# Patient Record
Sex: MALE | Race: WHITE | NOT HISPANIC OR LATINO | URBAN - METROPOLITAN AREA
[De-identification: names, ages, dates, MRNs, and addresses within clinical notes are randomized per-mention and may not be internally consistent; named-entity substitution may affect disease eponyms.]

---

## 2017-01-25 NOTE — PATIENT DISCUSSION
GLAUCOMA SUSPECT, OU : INCREASED CUP/DISC RATIO WITH MILD VF AND OCT RNFL CHANGES. DISCUSSED TX V OBS.  PATIENT WISHES TO WAIT ON INTERVENTION AT THIS TIME

## 2017-01-25 NOTE — PATIENT DISCUSSION
Glaucoma Suspect Counseling: I have explained to the patient at length glaucoma potentially causes loss of peripheral vision due to damage to the optic nerve. I have explained that damage to the optic nerve from glaucoma is irreversible and that the available treatments for glaucoma are designed to prevent further damage if we determine glaucoma is present. I have explained the importance of regular follow-up with dilated eye exams to monitor for possible progression.

## 2017-01-25 NOTE — PATIENT DISCUSSION
Diabetes without Retinopathy Counseling : I have discussed with the patient the importance of controlling blood glucose, blood pressure and lipid levels levels to minimize the risk of developing retinal disease from diabetes. Explained the importance of annual dilated eye exams because effective treatment for diabetic retinopathy depends on timely intervention. The patient was instructed to call or return sooner if they noticed blurred or distorted vision, fluctuating vision, pain or redness around one or both eyes. Return for follow-up as scheduled.

## 2017-07-26 NOTE — PATIENT DISCUSSION
COLLIN EXACERBATED BY MGD - CONTINUE RESTASIS BID, STOP EYE RUBBING.  PT ON DOXY FOR SKIN - HELPED TO IMPROVE LIDS

## 2018-07-23 ENCOUNTER — IMPORTED ENCOUNTER (OUTPATIENT)
Dept: URBAN - METROPOLITAN AREA CLINIC 50 | Facility: CLINIC | Age: 68
End: 2018-07-23

## 2020-03-11 NOTE — PATIENT DISCUSSION
GLAUCOMA SUSPECT, OU : INCREASED CUP/DISC RATIO WITH MILD VF AND OCT RNFL CHANGES. IOP BORDERLINE TODAY. GOAL IOP &lt;17. SCHEDULE YEARLY VF AND OCT RNFL. DISCUSSED TX V OBS.  PATIENT WISHES TO WAIT ON INTERVENTION AT THIS TIME

## 2020-09-16 NOTE — PATIENT DISCUSSION
NTG OU : INCREASED CUP/DISC RATIO WITH MILD VF AND OCT RNFL CHANGES. IOP BORDERLINE TODAY. GOAL IOP &lt;17. DISCUSSED TX V OBS.  ADD SLT OD THEN OS

## 2020-11-03 NOTE — PATIENT DISCUSSION
NTG OU : S/P SLT OU, INCREASED CUP/DISC RATIO WITH MILD VF AND OCT RNFL CHANGES. IOP BORDERLINE TODAY. GOAL IOP &lt;17.

## 2021-04-17 ASSESSMENT — VISUAL ACUITY
OS_BAT: 20/40
OD_OTHER: 20/60. 20/100.
OS_CC: 20/25
OD_BAT: 20/60
OS_OTHER: 20/40. 20/60.
OD_CC: 20/60-

## 2021-04-17 ASSESSMENT — TONOMETRY
OS_IOP_MMHG: 19
OD_IOP_MMHG: 19

## 2021-04-21 NOTE — PATIENT DISCUSSION
NTG OU : S/P SLT OU, INCREASED CUP/DISC RATIO WITH MILD VF AND OCT RNFL CHANGES. IOP GOOD TODAY. GOAL IOP &lt;17.

## 2021-04-21 NOTE — PATIENT DISCUSSION
EPIRETINAL MEMBRANE, OS. VISUALLY SIGNIFICANT TO THE PATIENT AT THIS TIME. KEEP F/U WITH DR Jose Daniel Chambers.

## 2021-07-21 ENCOUNTER — APPOINTMENT (RX ONLY)
Dept: URBAN - METROPOLITAN AREA CLINIC 86 | Facility: CLINIC | Age: 71
Setting detail: DERMATOLOGY
End: 2021-07-21

## 2021-07-21 DIAGNOSIS — L57.8 OTHER SKIN CHANGES DUE TO CHRONIC EXPOSURE TO NONIONIZING RADIATION: ICD-10-CM

## 2021-07-21 DIAGNOSIS — L82.0 INFLAMED SEBORRHEIC KERATOSIS: ICD-10-CM

## 2021-07-21 PROCEDURE — 99203 OFFICE O/P NEW LOW 30 MIN: CPT

## 2021-07-21 PROCEDURE — ? COUNSELING

## 2021-07-21 PROCEDURE — ? DEFER

## 2021-07-21 ASSESSMENT — LOCATION DETAILED DESCRIPTION DERM
LOCATION DETAILED: RIGHT CENTRAL MALAR CHEEK
LOCATION DETAILED: RIGHT NASAL SIDEWALL

## 2021-07-21 ASSESSMENT — LOCATION ZONE DERM
LOCATION ZONE: NOSE
LOCATION ZONE: FACE

## 2021-07-21 ASSESSMENT — LOCATION SIMPLE DESCRIPTION DERM
LOCATION SIMPLE: RIGHT CHEEK
LOCATION SIMPLE: RIGHT NOSE

## 2021-07-27 ENCOUNTER — APPOINTMENT (RX ONLY)
Dept: URBAN - METROPOLITAN AREA CLINIC 86 | Facility: CLINIC | Age: 71
Setting detail: DERMATOLOGY
End: 2021-07-27

## 2021-07-27 DIAGNOSIS — L82.0 INFLAMED SEBORRHEIC KERATOSIS: ICD-10-CM

## 2021-07-27 PROBLEM — D48.5 NEOPLASM OF UNCERTAIN BEHAVIOR OF SKIN: Status: ACTIVE | Noted: 2021-07-27

## 2021-07-27 PROCEDURE — ? BIOPSY BY SHAVE METHOD

## 2021-07-27 PROCEDURE — 11102 TANGNTL BX SKIN SINGLE LES: CPT

## 2021-07-27 ASSESSMENT — LOCATION ZONE DERM: LOCATION ZONE: NOSE

## 2021-07-27 ASSESSMENT — LOCATION DETAILED DESCRIPTION DERM: LOCATION DETAILED: RIGHT NASAL SIDEWALL

## 2021-07-27 ASSESSMENT — LOCATION SIMPLE DESCRIPTION DERM: LOCATION SIMPLE: RIGHT NOSE

## 2022-12-15 NOTE — PATIENT DISCUSSION
No retinal detachment or retinal tear noted. Trilobed Flap Text: The defect edges were debeveled with a #15 scalpel blade.  Given the location of the defect and the proximity to free margins a trilobed flap was deemed most appropriate.  Using a sterile surgical marker, an appropriate trilobed flap drawn around the defect.    The area thus outlined was incised deep to adipose tissue with a #15 scalpel blade.  The skin margins were undermined to an appropriate distance in all directions utilizing iris scissors.

## 2024-06-18 ENCOUNTER — CONSULTATION/EVALUATION (OUTPATIENT)
Dept: URBAN - METROPOLITAN AREA CLINIC 50 | Facility: LOCATION | Age: 74
End: 2024-06-18

## 2024-06-18 DIAGNOSIS — H25.13: ICD-10-CM

## 2024-06-18 PROCEDURE — 99204 OFFICE O/P NEW MOD 45 MIN: CPT

## 2024-06-18 ASSESSMENT — VISUAL ACUITY
OD_OTHER: 20/60. 20/100.
OS_OTHER: 20/40. 20/60.
OD_GLARE: 20/100
OD_GLARE: 20/60
OD_CC: 20/40
OS_CC: 20/50
OS_GLARE: 20/60
OS_GLARE: 20/50
OU_CC: 20/40

## 2024-06-18 ASSESSMENT — TONOMETRY
OD_IOP_MMHG: 18
OS_IOP_MMHG: 17

## 2024-07-02 ENCOUNTER — PRE-OP/H&P (OUTPATIENT)
Dept: URBAN - METROPOLITAN AREA CLINIC 50 | Facility: LOCATION | Age: 74
End: 2024-07-02

## 2024-07-02 DIAGNOSIS — H25.13: ICD-10-CM

## 2024-07-02 PROCEDURE — 92136 OPHTHALMIC BIOMETRY: CPT | Mod: NC

## 2024-07-02 PROCEDURE — 92025-3 CORNEAL TOPO, REFUSED

## 2024-07-02 PROCEDURE — 92134 CPTRZ OPH DX IMG PST SGM RTA: CPT | Mod: NC

## 2024-07-02 ASSESSMENT — KERATOMETRY
OS_K2POWER_DIOPTERS: 42.75
OD_K1POWER_DIOPTERS: 42.87
OS_AXISANGLE_DEGREES: 10
OS_K1POWER_DIOPTERS: 43.25
OD_K2POWER_DIOPTERS: 42.25
OD_AXISANGLE2_DEGREES: 70
OD_AXISANGLE_DEGREES: 160
OS_AXISANGLE2_DEGREES: 100

## 2024-07-02 ASSESSMENT — TONOMETRY
OS_IOP_MMHG: 17
OD_IOP_MMHG: 17

## 2024-07-02 ASSESSMENT — VISUAL ACUITY
OS_CC: 20/70
OS_PH: 20/50-1
OD_CC: 20/200
OD_PH: 20/40

## 2024-09-10 ENCOUNTER — PRE-OP/H&P (OUTPATIENT)
Dept: URBAN - METROPOLITAN AREA CLINIC 50 | Facility: LOCATION | Age: 74
End: 2024-09-10

## 2024-09-10 DIAGNOSIS — H25.13: ICD-10-CM

## 2024-09-10 DIAGNOSIS — H35.363: ICD-10-CM

## 2024-09-10 PROCEDURE — PREOP PRE OP VISIT

## 2024-09-10 PROCEDURE — 92134 CPTRZ OPH DX IMG PST SGM RTA: CPT

## 2024-09-18 ENCOUNTER — POST-OP (OUTPATIENT)
Dept: URBAN - METROPOLITAN AREA CLINIC 48 | Facility: LOCATION | Age: 74
End: 2024-09-18

## 2024-09-18 ENCOUNTER — PREPPED CHART (OUTPATIENT)
Dept: URBAN - METROPOLITAN AREA CLINIC 48 | Facility: LOCATION | Age: 74
End: 2024-09-18

## 2024-09-18 ENCOUNTER — SURGERY/PROCEDURE (OUTPATIENT)
Dept: URBAN - METROPOLITAN AREA SURGERY 16 | Facility: SURGERY | Age: 74
End: 2024-09-18

## 2024-09-18 DIAGNOSIS — Z98.42: ICD-10-CM

## 2024-09-18 DIAGNOSIS — Z96.1: ICD-10-CM

## 2024-09-18 DIAGNOSIS — H25.13: ICD-10-CM

## 2024-09-18 PROCEDURE — 6698454AV REMOVE CATARACT, INSERT ADVANCED LENS (SX ONLY): Mod: 54,LT

## 2024-09-18 PROCEDURE — 99199PAV ADVANCED VISION

## 2024-09-25 ENCOUNTER — CO-MANAGED POST-OP DATA (OUTPATIENT)
Dept: URBAN - METROPOLITAN AREA CLINIC 50 | Facility: LOCATION | Age: 74
End: 2024-09-25

## 2024-10-02 ENCOUNTER — POST-OP (OUTPATIENT)
Dept: URBAN - METROPOLITAN AREA CLINIC 48 | Facility: LOCATION | Age: 74
End: 2024-10-02

## 2024-10-02 ENCOUNTER — SURGERY/PROCEDURE (OUTPATIENT)
Dept: URBAN - METROPOLITAN AREA SURGERY 16 | Facility: SURGERY | Age: 74
End: 2024-10-02

## 2024-10-02 DIAGNOSIS — Z96.1: ICD-10-CM

## 2024-10-02 DIAGNOSIS — H25.13: ICD-10-CM

## 2024-10-02 DIAGNOSIS — Z98.41: ICD-10-CM

## 2024-10-02 PROCEDURE — 6698454CV REMOVE CATARACT, INSERT LENS,SX ONLY, CUSTOM VISION: Mod: 54,RT,79,RT

## 2024-10-02 PROCEDURE — 99199PCV CUSTOM VISION

## 2024-10-09 ENCOUNTER — CO-MANAGED POST-OP DATA (OUTPATIENT)
Dept: URBAN - METROPOLITAN AREA CLINIC 50 | Facility: LOCATION | Age: 74
End: 2024-10-09

## 2024-11-14 ENCOUNTER — CO-MANAGED POST-OP DATA (OUTPATIENT)
Dept: URBAN - METROPOLITAN AREA CLINIC 50 | Facility: LOCATION | Age: 74
End: 2024-11-14